# Patient Record
Sex: FEMALE | Race: WHITE
[De-identification: names, ages, dates, MRNs, and addresses within clinical notes are randomized per-mention and may not be internally consistent; named-entity substitution may affect disease eponyms.]

---

## 2017-05-19 ENCOUNTER — HOSPITAL ENCOUNTER (EMERGENCY)
Dept: HOSPITAL 12 - ER | Age: 37
Discharge: HOME | End: 2017-05-19
Payer: COMMERCIAL

## 2017-05-19 VITALS — WEIGHT: 140 LBS | BODY MASS INDEX: 23.32 KG/M2 | HEIGHT: 65 IN

## 2017-05-19 DIAGNOSIS — M75.91: Primary | ICD-10-CM

## 2017-05-19 DIAGNOSIS — F10.20: ICD-10-CM

## 2017-05-19 PROCEDURE — A4663 DIALYSIS BLOOD PRESSURE CUFF: HCPCS

## 2017-05-19 NOTE — NUR
Patient discharged to home in stable conditon.  Written and verbal after care 
instructions given to patient. Patient verbalizes understanding of 
instructions.

## 2019-12-09 ENCOUNTER — HOSPITAL ENCOUNTER (EMERGENCY)
Dept: HOSPITAL 12 - ER | Age: 39
Discharge: HOME | End: 2019-12-09
Payer: MEDICAID

## 2019-12-09 VITALS — HEIGHT: 65 IN | BODY MASS INDEX: 22.49 KG/M2 | WEIGHT: 135 LBS

## 2019-12-09 DIAGNOSIS — L02.416: Primary | ICD-10-CM

## 2019-12-09 PROCEDURE — A4663 DIALYSIS BLOOD PRESSURE CUFF: HCPCS

## 2019-12-09 NOTE — NUR
PATIENT WAS SEEN BY MD.   DC, RX AND FOLLOW UP INSTRUCTIONS GIVEN AND EXPLAINED 
TO PATIENT WHO STATES SHE UNDERSTANDS ALL INSTRUCTIONS.